# Patient Record
(demographics unavailable — no encounter records)

---

## 2025-01-25 NOTE — HISTORY OF PRESENT ILLNESS
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 Denies popping sensation. Swelling/pain to posterior. No clicking/buckling/locking. Experiences increased pain with prolonged rest. Minimal discomfort with bending/walking. No prior injury. No formal treatment to date. Currently working. : Brittani.

## 2025-01-25 NOTE — DISCUSSION/SUMMARY
[Medication Risks Reviewed] : Medication risks reviewed [Surgical risks reviewed] : Surgical risks reviewed [de-identified] : The patient's condition is acute Confounding medical conditions/concerns: N/A Tests/Studies Independently Interpreted Today: xray of the L knee revealed no evidence of fx or subluxation ------------------------------------------------------------------------------------------------------------------   Meniscal tear vs early arthritis upon physical exam, discussed risks of potential meniscal surgery and risks of operative and non-operative treatment of arthritis. We will obtain an MRI to see if surgery is necessary and guide future treatment. In the interim, we discussed appropriate use of NSAIDs and side effects. Recommended rehab and discussed risks and benefits of possible injection.   Due to worsening pain and instability with mechanical symptoms, recommend the patient obtain MRI L  knee to rule out meniscus tear. Follow up after MRI to possibly rule out surgical pathology and discuss future treatment options.  Meniscal tear vs early arthritis upon physical exam, discussed risks of potential meniscal surgery and risks of operative and non-operative treatment of arthritis. We will obtain an MRI to see if surgery is necessary and guide future treatment. In the interim, we discussed appropriate use of NSAIDs and side effects. Recommended rehab and discussed risks and benefits of possible injection.  Prescribed patient Meloxicam 15mg daily and discussed risks of side effects, as well as timing/management of medication.  Side effects can include but are not limited to gastrointestinal ulcers and irritation, kidney failure, and bleeding issues. Use as directed and take with food to manage pain, inflammation, and discomfort.   Plan for PT- dispensed Rx in office   f/u after MRI   I, Michelle Hutson, attest that this documentation has been prepared under the direction and in the presence of Provider Dr. Carlos Pereira

## 2025-01-25 NOTE — HISTORY OF PRESENT ILLNESS
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 Denies popping sensation. Swelling/pain to posterior. No clicking/buckling/locking. Experiences increased pain with prolonged rest. Minimal discomfort with bending/walking. No prior injury. No formal treatment to date. Currently working. : Brittani.

## 2025-01-25 NOTE — PHYSICAL EXAM
[Equivocal] : equivocal Lb [Left] : left knee [] : negative Lachmann [de-identified] : click with testing  [FreeTextEntry9] :  xray of the L knee revealed no evidence of fx or subluxation

## 2025-01-25 NOTE — DISCUSSION/SUMMARY
[Medication Risks Reviewed] : Medication risks reviewed [Surgical risks reviewed] : Surgical risks reviewed [de-identified] : The patient's condition is acute Confounding medical conditions/concerns: N/A Tests/Studies Independently Interpreted Today: xray of the L knee revealed no evidence of fx or subluxation ------------------------------------------------------------------------------------------------------------------   Meniscal tear vs early arthritis upon physical exam, discussed risks of potential meniscal surgery and risks of operative and non-operative treatment of arthritis. We will obtain an MRI to see if surgery is necessary and guide future treatment. In the interim, we discussed appropriate use of NSAIDs and side effects. Recommended rehab and discussed risks and benefits of possible injection.   Due to worsening pain and instability with mechanical symptoms, recommend the patient obtain MRI L  knee to rule out meniscus tear. Follow up after MRI to possibly rule out surgical pathology and discuss future treatment options.  Meniscal tear vs early arthritis upon physical exam, discussed risks of potential meniscal surgery and risks of operative and non-operative treatment of arthritis. We will obtain an MRI to see if surgery is necessary and guide future treatment. In the interim, we discussed appropriate use of NSAIDs and side effects. Recommended rehab and discussed risks and benefits of possible injection.  Prescribed patient Meloxicam 15mg daily and discussed risks of side effects, as well as timing/management of medication.  Side effects can include but are not limited to gastrointestinal ulcers and irritation, kidney failure, and bleeding issues. Use as directed and take with food to manage pain, inflammation, and discomfort.   Plan for PT- dispensed Rx in office   f/u after MRI   I, Michelle Hutson, attest that this documentation has been prepared under the direction and in the presence of Provider Dr. Carlos Pereira

## 2025-01-25 NOTE — HISTORY OF PRESENT ILLNESS
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 Denies popping sensation. Swelling/pain to posterior. No clicking/buckling/locking. Experiences increased pain with prolonged rest. Minimal discomfort with bending/walking. No prior injury. No formal treatment to date. Currently working. : Brittani.

## 2025-01-25 NOTE — PHYSICAL EXAM
[Equivocal] : equivocal Lb [Left] : left knee [] : negative Lachmann [de-identified] : click with testing  [FreeTextEntry9] :  xray of the L knee revealed no evidence of fx or subluxation

## 2025-01-25 NOTE — PHYSICAL EXAM
[Equivocal] : equivocal Lb [Left] : left knee [] : negative Lachmann [de-identified] : click with testing  [FreeTextEntry9] :  xray of the L knee revealed no evidence of fx or subluxation

## 2025-01-25 NOTE — DISCUSSION/SUMMARY
[Medication Risks Reviewed] : Medication risks reviewed [Surgical risks reviewed] : Surgical risks reviewed [de-identified] : The patient's condition is acute Confounding medical conditions/concerns: N/A Tests/Studies Independently Interpreted Today: xray of the L knee revealed no evidence of fx or subluxation ------------------------------------------------------------------------------------------------------------------   Meniscal tear vs early arthritis upon physical exam, discussed risks of potential meniscal surgery and risks of operative and non-operative treatment of arthritis. We will obtain an MRI to see if surgery is necessary and guide future treatment. In the interim, we discussed appropriate use of NSAIDs and side effects. Recommended rehab and discussed risks and benefits of possible injection.   Due to worsening pain and instability with mechanical symptoms, recommend the patient obtain MRI L  knee to rule out meniscus tear. Follow up after MRI to possibly rule out surgical pathology and discuss future treatment options.  Meniscal tear vs early arthritis upon physical exam, discussed risks of potential meniscal surgery and risks of operative and non-operative treatment of arthritis. We will obtain an MRI to see if surgery is necessary and guide future treatment. In the interim, we discussed appropriate use of NSAIDs and side effects. Recommended rehab and discussed risks and benefits of possible injection.  Prescribed patient Meloxicam 15mg daily and discussed risks of side effects, as well as timing/management of medication.  Side effects can include but are not limited to gastrointestinal ulcers and irritation, kidney failure, and bleeding issues. Use as directed and take with food to manage pain, inflammation, and discomfort.   Plan for PT- dispensed Rx in office   f/u after MRI   I, Michelle Hutson, attest that this documentation has been prepared under the direction and in the presence of Provider Dr. Carlos Pereira

## 2025-02-01 NOTE — DISCUSSION/SUMMARY
[Surgical risks reviewed] : Surgical risks reviewed [Medication Risks Reviewed] : Medication risks reviewed [de-identified] : WC DOI: 1/17/2025: The patients condition is acute.  Tests/Studies Independently Interpreted Today: MRI left knee reveals evidence of moderate hamstring strains with associated effusion, patellofemoral degeneration.   We reviewed the MRI findings and discussed their diagnosis and treatment options at length including the risks and benefits of both surgical and non-surgical options for the patients hamstring strain. Discussed risks of potential surgery and progression of injury. There are risks of tendon rupture. However, due to the risks of the surgery, we will try NSAIDs and therapy. Discussed management of medication.   Start physical therapy to work on stretching and reduce inflammation.   Prescribed patient Meloxicam 15mg daily and discussed risks of side effects, as well as timing/management of medication.  Side effects can include but are not limited to gastrointestinal ulcers and irritation, kidney failure, and bleeding issues. Use as directed and take with food to manage pain, inflammation, and discomfort.   Follow up in 4 weeks

## 2025-02-01 NOTE — HISTORY OF PRESENT ILLNESS
[de-identified] : Patient is here to follow up on MRI results for left knee. Notes some improvement. Did not begin PT. SUSAN DOI: 1/17/25.

## 2025-02-01 NOTE — PHYSICAL EXAM
[Left] : left knee [4___] : hamstring 4[unfilled]/5 [] : no calf tenderness [FreeTextEntry8] : Tender hamstring belly, tender proximal hamstring  [TWNoteComboBox7] : flexion 125 degrees [de-identified] : extension 3 degrees

## 2025-02-01 NOTE — DATA REVIEWED
[MRI] : MRI [Left] : left [Knee] : knee [Report was reviewed and noted in the chart] : The report was reviewed and noted in the chart [I independently reviewed and interpreted images and report] : I independently reviewed and interpreted images and report [I reviewed the films/CD] : I reviewed the films/CD [FreeTextEntry1] : MRI left knee reveals evidence of moderate hamstring strains with associated effusion, patellofemoral degeneration.

## 2025-02-01 NOTE — PHYSICAL EXAM
[Left] : left knee [4___] : hamstring 4[unfilled]/5 [] : no calf tenderness [FreeTextEntry8] : Tender hamstring belly, tender proximal hamstring  [TWNoteComboBox7] : flexion 125 degrees [de-identified] : extension 3 degrees

## 2025-02-01 NOTE — DISCUSSION/SUMMARY
[Medication Risks Reviewed] : Medication risks reviewed [Surgical risks reviewed] : Surgical risks reviewed [de-identified] : WC DOI: 1/17/2025: The patients condition is acute.  Tests/Studies Independently Interpreted Today: MRI left knee reveals evidence of moderate hamstring strains with associated effusion, patellofemoral degeneration.   We reviewed the MRI findings and discussed their diagnosis and treatment options at length including the risks and benefits of both surgical and non-surgical options for the patients hamstring strain. Discussed risks of potential surgery and progression of injury. There are risks of tendon rupture. However, due to the risks of the surgery, we will try NSAIDs and therapy. Discussed management of medication.   Start physical therapy to work on stretching and reduce inflammation.   Prescribed patient Meloxicam 15mg daily and discussed risks of side effects, as well as timing/management of medication.  Side effects can include but are not limited to gastrointestinal ulcers and irritation, kidney failure, and bleeding issues. Use as directed and take with food to manage pain, inflammation, and discomfort.   Follow up in 4 weeks

## 2025-02-01 NOTE — HISTORY OF PRESENT ILLNESS
[de-identified] : Patient is here to follow up on MRI results for left knee. Notes some improvement. Did not begin PT. SUSAN DOI: 1/17/25.

## 2025-03-13 NOTE — WORK
[Sprain/Strain] : sprain/strain [Was the competent medical cause of the injury] : was the competent medical cause of the injury [Are consistent with the injury] : are consistent with the injury [Consistent with my objective findings] : consistent with my objective findings [Partial] : partial [Does not reveal pre-existing condition(s) that may affect treatment/prognosis] : does not reveal pre-existing condition(s) that may affect treatment/prognosis [No Rx restrictions] : No Rx restrictions. [Patient] : patient [I provided the services listed above] :  I provided the services listed above.

## 2025-03-17 NOTE — DISCUSSION/SUMMARY
[Medication Risks Reviewed] : Medication risks reviewed [Surgical risks reviewed] : Surgical risks reviewed [de-identified] : WC DOI: 1/17/2025: The patients condition is acute.  Tests/Studies Independently Interpreted Today:  ------------------------------------------------------------------------------------------------------------------  Discussed with pt that his knee appears stable upon exam and believe the incident he experienced was an aggravation of his OA. At this time would rec continued maintenance with PT and NSAIDs as needed considering he notes the pain has improved since the incident with the use of his Mobic   Plan for PT- new Rx dispensed in office  Discussed risks of potential surgery. However, due to the risks of the surgery, we will try NSAIDs and therapy. Discussed management of medication.   Continue PRN use of Mobic. Discussed risks of side effects and timing and management of medication.  Side effects can include but are not limited to gi ulcers and irritation, as well as kidney failure and bleeding issues. Use as directed and take with food.    I, Michelle Hutson, attest that this documentation has been prepared under the direction and in the presence of Provider Dr. Carlos Pereira

## 2025-03-17 NOTE — HISTORY OF PRESENT ILLNESS
[de-identified] : Pt is here for a follow up the L knee WC DOI: 1/17/2025,  noted an instance of the knee pain that made it difficult to get out of bed on 3/9/25. He took Mobic and he pain resolved otherwise doing well currently working

## 2025-03-17 NOTE — PHYSICAL EXAM
[Left] : left knee [] : no erythema [FreeTextEntry8] : slight PF tenderness [TWNoteComboBox7] : flexion 125 degrees [de-identified] : extension 3 degrees

## 2025-03-17 NOTE — PHYSICAL EXAM
[Left] : left knee [] : no erythema [FreeTextEntry8] : slight PF tenderness [TWNoteComboBox7] : flexion 125 degrees [de-identified] : extension 3 degrees

## 2025-03-17 NOTE — DISCUSSION/SUMMARY
[Medication Risks Reviewed] : Medication risks reviewed [Surgical risks reviewed] : Surgical risks reviewed [de-identified] : WC DOI: 1/17/2025: The patients condition is acute.  Tests/Studies Independently Interpreted Today:  ------------------------------------------------------------------------------------------------------------------  Discussed with pt that his knee appears stable upon exam and believe the incident he experienced was an aggravation of his OA. At this time would rec continued maintenance with PT and NSAIDs as needed considering he notes the pain has improved since the incident with the use of his Mobic   Plan for PT- new Rx dispensed in office  Discussed risks of potential surgery. However, due to the risks of the surgery, we will try NSAIDs and therapy. Discussed management of medication.   Continue PRN use of Mobic. Discussed risks of side effects and timing and management of medication.  Side effects can include but are not limited to gi ulcers and irritation, as well as kidney failure and bleeding issues. Use as directed and take with food.    I, Michelle Hutson, attest that this documentation has been prepared under the direction and in the presence of Provider Dr. Carlos Preeira

## 2025-03-17 NOTE — DISCUSSION/SUMMARY
[Medication Risks Reviewed] : Medication risks reviewed [Surgical risks reviewed] : Surgical risks reviewed [de-identified] : WC DOI: 1/17/2025: The patients condition is acute.  Tests/Studies Independently Interpreted Today:  ------------------------------------------------------------------------------------------------------------------  Discussed with pt that his knee appears stable upon exam and believe the incident he experienced was an aggravation of his OA. At this time would rec continued maintenance with PT and NSAIDs as needed considering he notes the pain has improved since the incident with the use of his Mobic   Plan for PT- new Rx dispensed in office  Discussed risks of potential surgery. However, due to the risks of the surgery, we will try NSAIDs and therapy. Discussed management of medication.   Continue PRN use of Mobic. Discussed risks of side effects and timing and management of medication.  Side effects can include but are not limited to gi ulcers and irritation, as well as kidney failure and bleeding issues. Use as directed and take with food.    I, Michelle Hutson, attest that this documentation has been prepared under the direction and in the presence of Provider Dr. Carlos Pereira

## 2025-03-17 NOTE — HISTORY OF PRESENT ILLNESS
[de-identified] : Pt is here for a follow up the L knee WC DOI: 1/17/2025,  noted an instance of the knee pain that made it difficult to get out of bed on 3/9/25. He took Mobic and he pain resolved otherwise doing well currently working

## 2025-03-17 NOTE — PHYSICAL EXAM
[Left] : left knee [] : no erythema [FreeTextEntry8] : slight PF tenderness [TWNoteComboBox7] : flexion 125 degrees [de-identified] : extension 3 degrees

## 2025-03-17 NOTE — HISTORY OF PRESENT ILLNESS
[de-identified] : Pt is here for a follow up the L knee WC DOI: 1/17/2025,  noted an instance of the knee pain that made it difficult to get out of bed on 3/9/25. He took Mobic and he pain resolved otherwise doing well currently working

## 2025-05-05 NOTE — HISTORY OF PRESENT ILLNESS
[de-identified] : Here for WC F/U DOI 1/17/25 injury to the left knee. Patient has been feeling really well since the last visit. Some intermittent aching with certain motions or bending. Has been attending PT which has been helping with the discomfort. Pain today 2/10

## 2025-05-05 NOTE — HISTORY OF PRESENT ILLNESS
[de-identified] : Here for WC F/U DOI 1/17/25 injury to the left knee. Patient has been feeling really well since the last visit. Some intermittent aching with certain motions or bending. Has been attending PT which has been helping with the discomfort. Pain today 2/10

## 2025-05-05 NOTE — PHYSICAL EXAM
[Left] : left knee [NL (0)] : extension 0 degrees [] : no tenderness [TWNoteComboBox7] : flexion 125 degrees [de-identified] : False

## 2025-05-05 NOTE — PHYSICAL EXAM
[Left] : left knee [NL (0)] : extension 0 degrees [] : no tenderness [TWNoteComboBox7] : flexion 125 degrees [de-identified] : False

## 2025-05-05 NOTE — DISCUSSION/SUMMARY
[Medication Risks Reviewed] : Medication risks reviewed [Surgical risks reviewed] : Surgical risks reviewed [de-identified] : WC DOI: 1/17/2025: currently working  Acute exacerbation of chronic condition.  Tests/Studies Independently Interpreted Today:  Reviewed previous notes & images  ------------------------------------------------------------------------------------------------------------------   Discussed treatment options for arthritis considering pt is doing well with PT, discussed risks of steroid injection plus or minus Visco supplementation, risks of Zilretta and benefits, role of surgery and MRI, risks and role of NSAIDs and side effects, benefits of therapy.    Since pt is doing well and reporting min to no pain with PT will plan for continued physical therapy at this time Re-prescribed  PT today, 2-3x/week for 4-6 weeks   Continue use of previously prescribed Meloxicam 15mg for pain management and inflammation - use as directed and take with food. situation still evolving but more encouraging than previous encounters, hope to avoid surgery  I, Michelle Hutson, attest that this documentation has been prepared under the direction and in the presence of Provider Dr. Carlos Pereira

## 2025-05-05 NOTE — DISCUSSION/SUMMARY
[Medication Risks Reviewed] : Medication risks reviewed [Surgical risks reviewed] : Surgical risks reviewed [de-identified] : WC DOI: 1/17/2025: currently working  Acute exacerbation of chronic condition.  Tests/Studies Independently Interpreted Today:  Reviewed previous notes & images  ------------------------------------------------------------------------------------------------------------------   Discussed treatment options for arthritis considering pt is doing well with PT, discussed risks of steroid injection plus or minus Visco supplementation, risks of Zilretta and benefits, role of surgery and MRI, risks and role of NSAIDs and side effects, benefits of therapy.    Since pt is doing well and reporting min to no pain with PT will plan for continued physical therapy at this time Re-prescribed  PT today, 2-3x/week for 4-6 weeks   Continue use of previously prescribed Meloxicam 15mg for pain management and inflammation - use as directed and take with food. situation still evolving but more encouraging than previous encounters, hope to avoid surgery  I, Michelle Hutson, attest that this documentation has been prepared under the direction and in the presence of Provider Dr. Carlos Pereira

## 2025-05-05 NOTE — PHYSICAL EXAM
[Left] : left knee [NL (0)] : extension 0 degrees [] : no tenderness [TWNoteComboBox7] : flexion 125 degrees [de-identified] : False

## 2025-05-05 NOTE — HISTORY OF PRESENT ILLNESS
[de-identified] : Here for WC F/U DOI 1/17/25 injury to the left knee. Patient has been feeling really well since the last visit. Some intermittent aching with certain motions or bending. Has been attending PT which has been helping with the discomfort. Pain today 2/10

## 2025-05-05 NOTE — DISCUSSION/SUMMARY
[Medication Risks Reviewed] : Medication risks reviewed [Surgical risks reviewed] : Surgical risks reviewed [de-identified] : WC DOI: 1/17/2025: currently working  Acute exacerbation of chronic condition.  Tests/Studies Independently Interpreted Today:  Reviewed previous notes & images  ------------------------------------------------------------------------------------------------------------------   Discussed treatment options for arthritis considering pt is doing well with PT, discussed risks of steroid injection plus or minus Visco supplementation, risks of Zilretta and benefits, role of surgery and MRI, risks and role of NSAIDs and side effects, benefits of therapy.    Since pt is doing well and reporting min to no pain with PT will plan for continued physical therapy at this time Re-prescribed  PT today, 2-3x/week for 4-6 weeks   Continue use of previously prescribed Meloxicam 15mg for pain management and inflammation - use as directed and take with food. situation still evolving but more encouraging than previous encounters, hope to avoid surgery  I, Michelle Hutson, attest that this documentation has been prepared under the direction and in the presence of Provider Dr. Carlos Pereira